# Patient Record
Sex: MALE | Race: WHITE | Employment: FULL TIME | URBAN - METROPOLITAN AREA
[De-identification: names, ages, dates, MRNs, and addresses within clinical notes are randomized per-mention and may not be internally consistent; named-entity substitution may affect disease eponyms.]

---

## 2017-05-01 DIAGNOSIS — L30.9 DERMATITIS: ICD-10-CM

## 2017-06-22 ENCOUNTER — OFFICE VISIT (OUTPATIENT)
Dept: FAMILY MEDICINE CLINIC | Age: 52
End: 2017-06-22

## 2017-06-22 VITALS
DIASTOLIC BLOOD PRESSURE: 82 MMHG | TEMPERATURE: 98.7 F | HEART RATE: 75 BPM | SYSTOLIC BLOOD PRESSURE: 150 MMHG | WEIGHT: 265 LBS | OXYGEN SATURATION: 97 % | BODY MASS INDEX: 36.96 KG/M2

## 2017-06-22 DIAGNOSIS — J20.9 BRONCHITIS WITH BRONCHOSPASM: Primary | ICD-10-CM

## 2017-06-22 PROCEDURE — 99214 OFFICE O/P EST MOD 30 MIN: CPT | Performed by: FAMILY MEDICINE

## 2017-06-22 RX ORDER — GUAIFENESIN AND CODEINE PHOSPHATE 100; 10 MG/5ML; MG/5ML
5 SOLUTION ORAL EVERY 4 HOURS PRN
Qty: 118 ML | Refills: 0 | Status: SHIPPED | OUTPATIENT
Start: 2017-06-22 | End: 2017-06-29

## 2017-06-22 RX ORDER — AZITHROMYCIN 250 MG/1
TABLET, FILM COATED ORAL
Qty: 1 PACKET | Refills: 0 | Status: SHIPPED | OUTPATIENT
Start: 2017-06-22 | End: 2017-07-02

## 2017-06-22 RX ORDER — FLUCONAZOLE 150 MG/1
150 TABLET ORAL ONCE
Qty: 2 TABLET | Refills: 0 | Status: SHIPPED | OUTPATIENT
Start: 2017-06-22 | End: 2017-06-22

## 2017-06-27 ENCOUNTER — HOSPITAL ENCOUNTER (OUTPATIENT)
Dept: OTHER | Age: 52
Discharge: HOME | End: 2017-06-27
Attending: FAMILY MEDICINE | Admitting: FAMILY MEDICINE

## 2017-06-27 DIAGNOSIS — J20.9 BRONCHITIS WITH BRONCHOSPASM: ICD-10-CM

## 2017-06-29 DIAGNOSIS — R93.89 ABNORMAL CHEST X-RAY: Primary | ICD-10-CM

## 2017-06-29 DIAGNOSIS — R05.9 COUGH: ICD-10-CM

## 2017-06-30 LAB
ALLERGEN CANDIDA ALBICANS: 0.13 KU/L
ALLERGEN SEE NOTE: NORMAL

## 2017-09-14 ENCOUNTER — OFFICE VISIT (OUTPATIENT)
Dept: FAMILY MEDICINE CLINIC | Age: 52
End: 2017-09-14

## 2017-09-14 ENCOUNTER — TELEPHONE (OUTPATIENT)
Dept: FAMILY MEDICINE CLINIC | Age: 52
End: 2017-09-14

## 2017-09-14 VITALS
SYSTOLIC BLOOD PRESSURE: 141 MMHG | OXYGEN SATURATION: 96 % | BODY MASS INDEX: 37.31 KG/M2 | HEART RATE: 84 BPM | WEIGHT: 267.5 LBS | RESPIRATION RATE: 20 BRPM | DIASTOLIC BLOOD PRESSURE: 87 MMHG | TEMPERATURE: 97.6 F

## 2017-09-14 DIAGNOSIS — K21.9 GASTROESOPHAGEAL REFLUX DISEASE, ESOPHAGITIS PRESENCE NOT SPECIFIED: ICD-10-CM

## 2017-09-14 DIAGNOSIS — R10.13 EPIGASTRIC PAIN: Primary | ICD-10-CM

## 2017-09-14 DIAGNOSIS — J01.00 ACUTE MAXILLARY SINUSITIS, RECURRENCE NOT SPECIFIED: ICD-10-CM

## 2017-09-14 DIAGNOSIS — F10.90 HEAVY ALCOHOL CONSUMPTION: ICD-10-CM

## 2017-09-14 PROCEDURE — 99214 OFFICE O/P EST MOD 30 MIN: CPT | Performed by: FAMILY MEDICINE

## 2017-09-14 RX ORDER — BENZONATATE 200 MG/1
200 CAPSULE ORAL 3 TIMES DAILY PRN
Qty: 30 CAPSULE | Refills: 0 | Status: ON HOLD | OUTPATIENT
Start: 2017-09-14 | End: 2017-09-22 | Stop reason: HOSPADM

## 2017-09-14 RX ORDER — AMOXICILLIN 875 MG/1
875 TABLET, COATED ORAL 2 TIMES DAILY
Qty: 20 TABLET | Refills: 0 | Status: SHIPPED | OUTPATIENT
Start: 2017-09-14 | End: 2017-09-24

## 2017-09-19 ENCOUNTER — OFFICE VISIT (OUTPATIENT)
Dept: FAMILY MEDICINE CLINIC | Age: 52
End: 2017-09-19

## 2017-09-19 VITALS
WEIGHT: 270 LBS | BODY MASS INDEX: 37.66 KG/M2 | DIASTOLIC BLOOD PRESSURE: 113 MMHG | OXYGEN SATURATION: 96 % | SYSTOLIC BLOOD PRESSURE: 156 MMHG | TEMPERATURE: 98.3 F | HEART RATE: 94 BPM

## 2017-09-19 DIAGNOSIS — R06.02 SHORTNESS OF BREATH: ICD-10-CM

## 2017-09-19 DIAGNOSIS — R07.9 CHEST PAIN, UNSPECIFIED TYPE: Primary | ICD-10-CM

## 2017-09-19 DIAGNOSIS — R94.31 ABNORMAL EKG: ICD-10-CM

## 2017-09-19 PROCEDURE — 99213 OFFICE O/P EST LOW 20 MIN: CPT | Performed by: FAMILY MEDICINE

## 2017-09-19 PROCEDURE — 93000 ELECTROCARDIOGRAM COMPLETE: CPT | Performed by: FAMILY MEDICINE

## 2017-09-20 PROBLEM — I50.21 ACUTE SYSTOLIC HEART FAILURE (HCC): Status: ACTIVE | Noted: 2017-09-20

## 2017-09-21 ENCOUNTER — TELEPHONE (OUTPATIENT)
Dept: FAMILY MEDICINE CLINIC | Age: 52
End: 2017-09-21

## 2017-09-25 ENCOUNTER — TELEPHONE (OUTPATIENT)
Dept: CARDIOLOGY CLINIC | Age: 52
End: 2017-09-25

## 2017-09-27 ENCOUNTER — OFFICE VISIT (OUTPATIENT)
Dept: CARDIOLOGY CLINIC | Age: 52
End: 2017-09-27

## 2017-09-27 VITALS
WEIGHT: 254.8 LBS | DIASTOLIC BLOOD PRESSURE: 72 MMHG | BODY MASS INDEX: 33.62 KG/M2 | SYSTOLIC BLOOD PRESSURE: 126 MMHG | HEART RATE: 78 BPM

## 2017-09-27 DIAGNOSIS — I50.22 CHRONIC SYSTOLIC CONGESTIVE HEART FAILURE (HCC): ICD-10-CM

## 2017-09-27 DIAGNOSIS — I50.21 ACUTE SYSTOLIC CONGESTIVE HEART FAILURE (HCC): Primary | ICD-10-CM

## 2017-09-27 DIAGNOSIS — I42.0 CARDIOMYOPATHY, DILATED, NONISCHEMIC (HCC): ICD-10-CM

## 2017-09-27 PROCEDURE — 99214 OFFICE O/P EST MOD 30 MIN: CPT | Performed by: NURSE PRACTITIONER

## 2017-09-27 ASSESSMENT — ENCOUNTER SYMPTOMS
GASTROINTESTINAL NEGATIVE: 1
RESPIRATORY NEGATIVE: 1

## 2017-09-29 ENCOUNTER — OFFICE VISIT (OUTPATIENT)
Dept: FAMILY MEDICINE CLINIC | Age: 52
End: 2017-09-29

## 2017-09-29 ENCOUNTER — TELEPHONE (OUTPATIENT)
Dept: FAMILY MEDICINE CLINIC | Age: 52
End: 2017-09-29

## 2017-09-29 VITALS
WEIGHT: 252.8 LBS | HEART RATE: 78 BPM | TEMPERATURE: 98.6 F | SYSTOLIC BLOOD PRESSURE: 120 MMHG | RESPIRATION RATE: 16 BRPM | BODY MASS INDEX: 33.35 KG/M2 | DIASTOLIC BLOOD PRESSURE: 80 MMHG

## 2017-09-29 DIAGNOSIS — I50.21 ACUTE SYSTOLIC CONGESTIVE HEART FAILURE (HCC): Primary | ICD-10-CM

## 2017-09-29 DIAGNOSIS — I50.21 ACUTE SYSTOLIC CONGESTIVE HEART FAILURE (HCC): ICD-10-CM

## 2017-09-29 LAB
ANION GAP SERPL CALCULATED.3IONS-SCNC: 18 MMOL/L (ref 3–16)
BUN BLDV-MCNC: 17 MG/DL (ref 7–20)
CALCIUM SERPL-MCNC: 9.9 MG/DL (ref 8.3–10.6)
CHLORIDE BLD-SCNC: 101 MMOL/L (ref 99–110)
CO2: 28 MMOL/L (ref 21–32)
CREAT SERPL-MCNC: 0.9 MG/DL (ref 0.9–1.3)
GFR AFRICAN AMERICAN: >60
GFR NON-AFRICAN AMERICAN: >60
GLUCOSE BLD-MCNC: 97 MG/DL (ref 70–99)
POTASSIUM SERPL-SCNC: 4.5 MMOL/L (ref 3.5–5.1)
SODIUM BLD-SCNC: 147 MMOL/L (ref 136–145)

## 2017-09-29 PROCEDURE — 99213 OFFICE O/P EST LOW 20 MIN: CPT | Performed by: FAMILY MEDICINE

## 2017-10-03 ENCOUNTER — OFFICE VISIT (OUTPATIENT)
Dept: CARDIOLOGY CLINIC | Age: 52
End: 2017-10-03

## 2017-10-03 VITALS
HEART RATE: 60 BPM | WEIGHT: 250.8 LBS | BODY MASS INDEX: 33.09 KG/M2 | SYSTOLIC BLOOD PRESSURE: 120 MMHG | DIASTOLIC BLOOD PRESSURE: 80 MMHG

## 2017-10-03 DIAGNOSIS — I50.21 ACUTE SYSTOLIC CONGESTIVE HEART FAILURE (HCC): ICD-10-CM

## 2017-10-03 DIAGNOSIS — I50.22 CHRONIC SYSTOLIC CONGESTIVE HEART FAILURE (HCC): Primary | ICD-10-CM

## 2017-10-03 DIAGNOSIS — I42.0 CARDIOMYOPATHY, DILATED, NONISCHEMIC (HCC): ICD-10-CM

## 2017-10-03 PROCEDURE — 99212 OFFICE O/P EST SF 10 MIN: CPT | Performed by: INTERNAL MEDICINE

## 2017-10-03 RX ORDER — CARVEDILOL 6.25 MG/1
6.25 TABLET ORAL 2 TIMES DAILY WITH MEALS
Qty: 60 TABLET | Refills: 3 | Status: SHIPPED | OUTPATIENT
Start: 2017-10-03 | End: 2017-12-04 | Stop reason: SDUPTHER

## 2017-10-03 NOTE — MR AVS SNAPSHOT
After Visit Summary             Iman Nitin   10/3/2017 9:15 AM   Office Visit    Description:  Male : 1965   Provider:  Vickie Fitch MD   Department:  [address] and Future Appointments         Below is a list of your follow-up and future appointments. This may not be a complete list as you may have made appointments directly with providers that we are not aware of or your providers may have made some for you. Please call your providers to confirm appointments. It is important to keep your appointments. Please bring your current insurance card, photo ID, co-pay, and all medication bottles to your appointment. If self-pay, payment is expected at the time of service. Your To-Do List     Future Appointments Provider Department Dept Phone    10/20/2017 9:00 AM Carina Nguyen DO [facility] [phone]    Please arrive 15 minutes prior to appointment, bring photo ID and insurance card. 2017 9:45 AM Vickie Fitch MD [address] [place] [address] [phone]    Please arrive 15 minutes prior to appointment, bring photo ID and insurance card. Information from Your Visit        Department     Name Address Phone Fax    [facility]  [place] [address] [phone]      Vital Signs     Blood Pressure Pulse Weight Body Mass Index Smoking Status       120/80 60 250 lb 12.8 oz (113.8 kg) 33.09 kg/m2 Never Smoker       Additional Information about your Body Mass Index (BMI)           Your BMI as listed above is considered obese (30 or more). BMI is an estimate of body fat, calculated from your height and weight. The higher your BMI, the greater your risk of heart disease, high blood pressure, type 2 diabetes, stroke, gallstones, arthritis, sleep apnea, and certain cancers. BMI is not perfect.   It may overestimate body fat in athletes and have a clinical question, please call your doctor's office.

## 2017-10-03 NOTE — PROGRESS NOTES
[facility]     Outpatient Follow Up Note    Subjective:   CHIEF COMPLAINT / HPI:  Dyspnea     Iman Taveras  presents today  for follow up for recent hospitalization for congestive heart failure. He feels much better. His breathing has improved a lot and weight is fine. He lost around 30 lbs of water. Angiogram without CAD. LVEF 20% on echo. He is tolerating the meds. Past Medical History:    Past Medical History:   Diagnosis Date    Esophagitis     In [address], EGD    Hypertension        Social History:    History   Smoking Status    Never Smoker   Smokeless Tobacco    Current User    Types: Snuff         Family History   Problem Relation Age of Onset    Diabetes Maternal Grandmother     Alcohol Abuse Maternal Aunt     Alcohol Abuse Maternal Uncle     Alcohol Abuse Maternal Grandfather     Hypertension Father     Heart Disease Father 48    Bipolar Disorder Maternal Aunt     Obesity Brother        Current Medications:  Current Outpatient Prescriptions on File Prior to Visit   Medication Sig Dispense Refill    aspirin 81 MG chewable tablet Take 1 tablet by mouth daily 30 tablet 3    carvedilol (COREG) 3.125 MG tablet Take 1 tablet by mouth 2 times daily (with meals) 60 tablet 3    lisinopril (PRINIVIL;ZESTRIL) 10 MG tablet Take 1 tablet by mouth daily 30 tablet 3    spironolactone (ALDACTONE) 25 MG tablet Take 1 tablet by mouth daily 30 tablet 3    furosemide (LASIX) 40 MG tablet Take 1 tablet by mouth 2 times daily 60 tablet 0    fluocinonide (LIDEX) 0.05 % cream APPLY TO AFFECTED AREA(S) TWO TIMES A DAY 30 g 0    terbinafine (LAMISIL) 1 % cream Apply to affected areas 2x/day for 2-4 weeks, until rash resolved. 24 g 1    triamcinolone (KENALOG) 0.1 % cream Apply topically 2 times daily. 45 g 1     No current facility-administered medications on file prior to visit.         REVIEW OF SYSTEMS:      CONSTITUTIONAL: No major weight gain or loss, fatigue, weakness, night sweats or fever.  HEENT: No new vision difficulties or ringing in the ears. RESPIRATORY: No new SOB, PND, orthopnea or cough. CARDIOVASCULAR: See HPI  GI: No nausea, vomiting, diarrhea, constipation, abdominal pain or changes in bowel habits. : No urinary frequency, urgency, incontinence hematuria or dysuria. SKIN: No cyanosis or skin lesions. MUSCULOSKELETAL: No new muscle or joint pain. NEUROLOGICAL: No syncope or TIA-like symptoms. No change in sensation or strength. PSYCHIATRIC: No anxiety, pain, insomnia or depression    Objective:   PHYSICAL EXAM:        VITALS:  /80  Pulse 60  Wt 250 lb 12.8 oz (113.8 kg)  BMI 33.09 kg/m2  CONSTITUTIONAL: Cooperative, no apparent distress, and appears well nourished / developed  NEUROLOGIC:  Awake and orientated to person, place and time. Sensation normal Motor normal.  PSYCH: Calm affect. SKIN: Warm and dry. HEENT: Sclera non-icteric, normocephalic. EOMI  Neck: Supple, no elevation of JVP, normal carotid pulses with no bruits and thyroid normal size. LUNGS:  No increased work of breathing and clear to auscultation, no crackles or wheezing  CARDIOVASCULAR:  Regular rate and rhythm with no murmurs, gallops, rubs, or abnormal heart sounds. The apical impulse not displaced  The carotid upstroke is normal in amplitude and contour without delay or bruit  JVP is not elevated  ABDOMEN:  Normal bowel sounds, non-distended and non-tender to palpation. No liver or spleen enlargement. EXT: No edema, no calf tenderness. Pulses are present bilaterally.     DATA:    No results found for: ALT, AST, GGT, ALKPHOS, BILITOT  Lab Results   Component Value Date    CREATININE 0.9 09/29/2017    BUN 17 09/29/2017     (H) 09/29/2017    K 4.5 09/29/2017     09/29/2017    CO2 28 09/29/2017     No results found for: TSH, P3JHDJL, A5VCONL, THYROIDAB  Lab Results   Component Value Date    WBC 6.5 09/22/2017    HGB 14.7 09/22/2017    HCT 44.3 09/22/2017    MCV 90.6 09/22/2017    PLT 275 09/22/2017     No components found for: CHLPL  Lab Results   Component Value Date    TRIG 97 09/21/2017    TRIG 150 09/20/2017     Lab Results   Component Value Date    HDL 51 09/21/2017    HDL 55 09/20/2017     Lab Results   Component Value Date    LDLCALC 110 (H) 09/21/2017    LDLCALC 108 (H) 09/20/2017     Lab Results   Component Value Date    LABVLDL 19 09/21/2017    LABVLDL 30 09/20/2017     :   Assessment /  Plan   Nonischemic cardiomyopathy  Improved with meds  Plan to uptitrate meds  Increase Coreg to 6.25 mg po bid               Domenic CHAVIRA  [facility]  I attest that this note was completed entirely by me

## 2017-10-20 ENCOUNTER — OFFICE VISIT (OUTPATIENT)
Dept: FAMILY MEDICINE CLINIC | Age: 52
End: 2017-10-20

## 2017-10-20 VITALS
HEART RATE: 69 BPM | OXYGEN SATURATION: 97 % | SYSTOLIC BLOOD PRESSURE: 133 MMHG | RESPIRATION RATE: 16 BRPM | DIASTOLIC BLOOD PRESSURE: 78 MMHG | HEIGHT: 73 IN | TEMPERATURE: 98.1 F

## 2017-10-20 DIAGNOSIS — Z23 NEED FOR INFLUENZA VACCINATION: ICD-10-CM

## 2017-10-20 DIAGNOSIS — I42.0 CARDIOMYOPATHY, DILATED, NONISCHEMIC (HCC): Primary | ICD-10-CM

## 2017-10-20 PROCEDURE — 90686 IIV4 VACC NO PRSV 0.5 ML IM: CPT | Performed by: FAMILY MEDICINE

## 2017-10-20 PROCEDURE — 90471 IMMUNIZATION ADMIN: CPT | Performed by: FAMILY MEDICINE

## 2017-10-20 PROCEDURE — 99213 OFFICE O/P EST LOW 20 MIN: CPT | Performed by: FAMILY MEDICINE

## 2017-10-20 NOTE — PROGRESS NOTES
[facility]  Progress Note  Marylene Rosa  1965    10/23/17    Chief Complaint:   Paige Biswas is a 50 y.o. male who is here for f/iu for CHF. HPI:   Back to increased work hours. Some afternoon fatigue. Increased stress taking care of family. Fatigue has improved. Has been able to return to work full time. At times will need to leave work early. Work is honoring [place] form I completed. ROS negative for headache, vision changes, chest pain, shortness of breath, abdominal pain, urinary sx, bowel changes. Past medical, surgical, and social history reviewed. Medications and allergies reviewed. No Known Allergies  Prior to Visit Medications    Medication Sig Taking? Authorizing Provider   Blood Pressure Monitoring (SPHYGMOMANOMETER) MISC Check blood pressure once weekly. Yes Angelo Palacios DO   carvedilol (COREG) 6.25 MG tablet Take 1 tablet by mouth 2 times daily (with meals) Yes Daxa Prajapati MD   aspirin 81 MG chewable tablet Take 1 tablet by mouth daily Yes Sheryle Linea, MD   carvedilol (COREG) 3.125 MG tablet Take 1 tablet by mouth 2 times daily (with meals) Yes Sheryle Linea, MD   lisinopril (PRINIVIL;ZESTRIL) 10 MG tablet Take 1 tablet by mouth daily Yes Sheryle Linea, MD   spironolactone (ALDACTONE) 25 MG tablet Take 1 tablet by mouth daily Yes Sheryle Linea, MD   furosemide (LASIX) 40 MG tablet Take 1 tablet by mouth 2 times daily Yes Sheryle Linea, MD   fluocinonide (LIDEX) 0.05 % cream APPLY TO AFFECTED AREA(S) TWO TIMES A DAY  Tiago Palacios, DO   terbinafine (LAMISIL) 1 % cream Apply to affected areas 2x/day for 2-4 weeks, until rash resolved. Tiago Palacios, DO   triamcinolone (KENALOG) 0.1 % cream Apply topically 2 times daily.   Raquel Garcia MD          Vitals:    10/20/17 0922 10/20/17 1019   BP: (!) 145/88 133/78   Pulse: 69    Resp: 16    Temp: 98.4140952981801 °F (36.7 °C)    TempSrc: Oral SpO2: 97%    Height: 6' 0.96\" (1.853 m)       Wt Readings from Last 3 Encounters:   10/03/17 250 lb 12.8 oz (113.8 kg)   09/29/17 252 lb 12.8 oz (114.7 kg)   09/27/17 254 lb 12.8 oz (115.6 kg)     BP Readings from Last 3 Encounters:   10/20/17 133/78   10/03/17 120/80   09/29/17 120/80       Patient Active Problem List   Diagnosis    Contact dermatitis    Adult BMI 30+    Gastroesophageal reflux disease    Heavy alcohol consumption    Acute systolic congestive heart failure (HCC)    Cardiomyopathy, dilated, nonischemic (HCC)    Chronic systolic congestive heart failure ([facility] [address]           Immunization History   Administered Date(s) Administered    Influenza, Quadv, 3 yrs and older, IM, Preservative Free 10/20/2017    Tdap (Boostrix, Adacel) 07/19/2007       Past Medical History:   Diagnosis Date    Esophagitis     In [place], EGD    Hypertension      History reviewed. No pertinent surgical history. Family History   Problem Relation Age of Onset    Diabetes Maternal Grandmother     Alcohol Abuse Maternal Aunt     Alcohol Abuse Maternal Uncle     Alcohol Abuse Maternal Grandfather     Hypertension Father     Heart Disease Father 48    Bipolar Disorder Maternal Aunt     Obesity Brother      Social History     Social History    Marital status:      Spouse name: N/A    Number of children: N/A    Years of education: N/A     Occupational History    Not on file. Social History Main Topics    Smoking status: Never Smoker    Smokeless tobacco: Current User     Types: Snuff    Alcohol use Yes      Comment: occasional alcohol use; 7 to 14    Drug use: No    Sexual activity: Not on file     Other Topics Concern    Not on file     Social History Narrative    No narrative on file       O: /78   Pulse 69   Temp 98.4757369386895 °F (36.7 °C) (Oral)   Resp 16   Ht 6' 0.96\" (1.853 m)   SpO2 97%   Physical Exam  GEN: No acute distress, cooperative, well nourished, alert.    HEENT: PEERLA, EOMI , normocephalic/atraumatic, nares and oropharynx clear. Mucus membranes normal, Tympanic membranes clear bilaterally. Neck: soft, supple, no thyromegaly, mass, no Lymphadenopathy  CV: Regular rate and rhythm, no murmur, rubs, gallops. No edema. Resp: Clear to auscultation bilaterally good air entry bilaterally  No crackles, wheeze. Breathing comfortably. Psych: normal affect. Neuro: AOx3      ASSESSMENT  1. Cardiomyopathy, dilated, nonischemic (HCC)  Blood Pressure Monitoring (SPHYGMOMANOMETER) MISC   2. Need for influenza vaccination  INFLUENZA, QUADV, 3 YRS AND OLDER, IM, PF, PREFILL SYR OR SDV, 0.5ML (FLUZONE QUADV, PF)           PLAN          See rest of plan under patient instructions. Return in about 6 months (around 4/20/2018) for Wellness/Health Maintenance. Patient Instructions   1) Track your blood pressure and heart rate, especially when feeling more fatigued. If BP averaging > 140/90, please call Dr. Angy Joseph or Dr. Hari Carias. 2) You received your flu shot today. 3) f/u in about 6 to 9 months for wellness visit. Please note a portion of this chart was generated using dragon dictation software. Although every effort was made to ensure the accuracy of this automated transcription, some errors in transcription may have occurred.

## 2017-10-20 NOTE — PATIENT INSTRUCTIONS
1) Track your blood pressure and heart rate, especially when feeling more fatigued. If BP averaging > 140/90, please call Dr. Christal Butler or Dr. Calista Weinberg. 2) You received your flu shot today. 3) f/u in about 6 to 9 months for wellness visit.

## 2017-10-31 DIAGNOSIS — I50.22 CHRONIC SYSTOLIC CONGESTIVE HEART FAILURE (HCC): Primary | ICD-10-CM

## 2017-11-02 RX ORDER — FUROSEMIDE 40 MG/1
TABLET ORAL
Qty: 45 TABLET | Refills: 5 | Status: SHIPPED | OUTPATIENT
Start: 2017-11-02 | End: 2018-03-01 | Stop reason: SDUPTHER

## 2017-12-04 ENCOUNTER — OFFICE VISIT (OUTPATIENT)
Dept: CARDIOLOGY CLINIC | Age: 52
End: 2017-12-04

## 2017-12-04 VITALS
BODY MASS INDEX: 33.76 KG/M2 | WEIGHT: 255.6 LBS | HEART RATE: 60 BPM | SYSTOLIC BLOOD PRESSURE: 138 MMHG | DIASTOLIC BLOOD PRESSURE: 80 MMHG

## 2017-12-04 DIAGNOSIS — I42.0 CARDIOMYOPATHY, DILATED, NONISCHEMIC (HCC): ICD-10-CM

## 2017-12-04 DIAGNOSIS — I50.21 ACUTE SYSTOLIC CONGESTIVE HEART FAILURE (HCC): ICD-10-CM

## 2017-12-04 DIAGNOSIS — I50.22 CHRONIC SYSTOLIC CONGESTIVE HEART FAILURE (HCC): ICD-10-CM

## 2017-12-04 PROCEDURE — 99213 OFFICE O/P EST LOW 20 MIN: CPT | Performed by: INTERNAL MEDICINE

## 2017-12-04 RX ORDER — CARVEDILOL 6.25 MG/1
12.5 TABLET ORAL 2 TIMES DAILY WITH MEALS
Qty: 120 TABLET | Refills: 5 | Status: SHIPPED | OUTPATIENT
Start: 2017-12-04 | End: 2018-01-04 | Stop reason: CLARIF

## 2017-12-04 NOTE — PROGRESS NOTES
[address]     Outpatient Follow Up Note    Subjective:   CHIEF COMPLAINT / HPI:  Dyspnea     Ermias Sin  presents today  for follow up for  congestive heart failure. He feels much better. His breathing remains stable. No edema and minimal dyspnea  He is tolerating the meds. Past Medical History:    Past Medical History:   Diagnosis Date    Esophagitis     In [place], EGD    Hypertension        Social History:    History   Smoking Status    Never Smoker   Smokeless Tobacco    Current User    Types: Snuff         Family History   Problem Relation Age of Onset    Diabetes Maternal Grandmother     Alcohol Abuse Maternal Aunt     Alcohol Abuse Maternal Uncle     Alcohol Abuse Maternal Grandfather     Hypertension Father     Heart Disease Father 48    Bipolar Disorder Maternal Aunt     Obesity Brother        Current Medications:  Current Outpatient Prescriptions on File Prior to Visit   Medication Sig Dispense Refill    furosemide (LASIX) 40 MG tablet Take 1 tablet by mouth in the morning and 1/2 tablet in the evening 45 tablet 5    Blood Pressure Monitoring (SPHYGMOMANOMETER) MISC Check blood pressure once weekly. 1 each 0    carvedilol (COREG) 6.25 MG tablet Take 1 tablet by mouth 2 times daily (with meals) 60 tablet 3    aspirin 81 MG chewable tablet Take 1 tablet by mouth daily 30 tablet 3    carvedilol (COREG) 3.125 MG tablet Take 1 tablet by mouth 2 times daily (with meals) 60 tablet 3    lisinopril (PRINIVIL;ZESTRIL) 10 MG tablet Take 1 tablet by mouth daily 30 tablet 3    spironolactone (ALDACTONE) 25 MG tablet Take 1 tablet by mouth daily 30 tablet 3    fluocinonide (LIDEX) 0.05 % cream APPLY TO AFFECTED AREA(S) TWO TIMES A DAY 30 g 0    terbinafine (LAMISIL) 1 % cream Apply to affected areas 2x/day for 2-4 weeks, until rash resolved. 24 g 1    triamcinolone (KENALOG) 0.1 % cream Apply topically 2 times daily.  45 g 1     No current facility-administered medications on file prior to visit. REVIEW OF SYSTEMS:      CONSTITUTIONAL: No major weight gain or loss, fatigue, weakness, night sweats or fever. HEENT: No new vision difficulties or ringing in the ears. RESPIRATORY: No new SOB, PND, orthopnea or cough. CARDIOVASCULAR: See HPI  GI: No nausea, vomiting, diarrhea, constipation, abdominal pain or changes in bowel habits. : No urinary frequency, urgency, incontinence hematuria or dysuria. SKIN: No cyanosis or skin lesions. MUSCULOSKELETAL: No new muscle or joint pain. NEUROLOGICAL: No syncope or TIA-like symptoms. No change in sensation or strength. PSYCHIATRIC: No anxiety, pain, insomnia or depression    Objective:   PHYSICAL EXAM:        VITALS:  /80   Pulse 60   Wt 255 lb 9.6 oz (115.9 kg)   BMI 33.76 kg/m²   CONSTITUTIONAL: Cooperative, no apparent distress, and appears well nourished / developed  NEUROLOGIC:  Awake and orientated to person, place and time. Sensation normal Motor normal.  PSYCH: Calm affect. SKIN: Warm and dry. HEENT: Sclera non-icteric, normocephalic. EOMI  Neck: Supple, no elevation of JVP, normal carotid pulses with no bruits and thyroid normal size. LUNGS:  No increased work of breathing and clear to auscultation, no crackles or wheezing  CARDIOVASCULAR:  Regular rate and rhythm with no murmurs, gallops, rubs, or abnormal heart sounds. The apical impulse not displaced  The carotid upstroke is normal in amplitude and contour without delay or bruit  JVP is not elevated  ABDOMEN:  Normal bowel sounds, non-distended and non-tender to palpation. No liver or spleen enlargement. EXT: No edema, no calf tenderness. Pulses are present bilaterally.     DATA:    No results found for: ALT, AST, GGT, ALKPHOS, BILITOT  Lab Results   Component Value Date    CREATININE 0.9 09/29/2017    BUN 17 09/29/2017     (H) 09/29/2017    K 4.5 09/29/2017     09/29/2017    CO2 28 09/29/2017     No results found for: TSH, M0NSSBO, L0KHJFM, [facility]  Lab Results   Component Value Date    WBC 6.5 09/22/2017    HGB 14.7 09/22/2017    HCT 44.3 09/22/2017    MCV 90.6 09/22/2017     09/22/2017     No components found for: CHLPL  Lab Results   Component Value Date    TRIG 97 09/21/2017    TRIG 150 09/20/2017     Lab Results   Component Value Date    HDL 51 09/21/2017    HDL 55 09/20/2017     Lab Results   Component Value Date    LDLCALC 110 (H) 09/21/2017    LDLCALC 108 (H) 09/20/2017     Lab Results   Component Value Date    LABVLDL 19 09/21/2017    LABVLDL 30 09/20/2017     :   Assessment /  Plan   Nonischemic cardiomyopathy  Increase Coreg to 12.5 mg po bid  RTC 4 weeks              Domenic L.  [facility]  I attest that this note was completed entirely by me

## 2018-01-04 ENCOUNTER — OFFICE VISIT (OUTPATIENT)
Dept: CARDIOLOGY CLINIC | Age: 53
End: 2018-01-04

## 2018-01-04 VITALS
DIASTOLIC BLOOD PRESSURE: 84 MMHG | WEIGHT: 260 LBS | HEART RATE: 78 BPM | BODY MASS INDEX: 34.34 KG/M2 | SYSTOLIC BLOOD PRESSURE: 136 MMHG

## 2018-01-04 DIAGNOSIS — I50.22 CHRONIC SYSTOLIC CONGESTIVE HEART FAILURE (HCC): ICD-10-CM

## 2018-01-04 DIAGNOSIS — I42.0 CARDIOMYOPATHY, DILATED, NONISCHEMIC (HCC): Primary | ICD-10-CM

## 2018-01-04 PROCEDURE — 99213 OFFICE O/P EST LOW 20 MIN: CPT | Performed by: INTERNAL MEDICINE

## 2018-01-04 NOTE — PROGRESS NOTES
09/22/2017    HCT 44.3 09/22/2017    MCV 90.6 09/22/2017     09/22/2017     No components found for: CHLPL  Lab Results   Component Value Date    TRIG 97 09/21/2017    TRIG 150 09/20/2017     Lab Results   Component Value Date    HDL 51 09/21/2017    HDL 55 09/20/2017     Lab Results   Component Value Date    LDLCALC 110 (H) 09/21/2017    LDLCALC 108 (H) 09/20/2017     Lab Results   Component Value Date    LABVLDL 19 09/21/2017    LABVLDL 30 09/20/2017     :   Assessment /  Plan   Nonischemic cardiomyopathy  Improved symptoms  Increase Coreg to 18.75 mg po bid  RTC 3 weeks

## 2018-01-05 RX ORDER — CARVEDILOL 6.25 MG/1
6.25 TABLET ORAL
Qty: 90 TABLET | Refills: 5 | Status: SHIPPED | OUTPATIENT
Start: 2018-01-05 | End: 2018-01-12 | Stop reason: SDUPTHER

## 2018-01-12 ENCOUNTER — TELEPHONE (OUTPATIENT)
Dept: CARDIOLOGY CLINIC | Age: 53
End: 2018-01-12

## 2018-01-12 DIAGNOSIS — I50.22 CHRONIC SYSTOLIC CONGESTIVE HEART FAILURE (HCC): Primary | ICD-10-CM

## 2018-01-12 RX ORDER — SPIRONOLACTONE 25 MG/1
25 TABLET ORAL DAILY
Qty: 30 TABLET | Refills: 3 | Status: SHIPPED | OUTPATIENT
Start: 2018-01-12 | End: 2018-05-18 | Stop reason: SINTOL

## 2018-01-12 RX ORDER — LISINOPRIL 10 MG/1
10 TABLET ORAL DAILY
Qty: 30 TABLET | Refills: 3 | Status: SHIPPED | OUTPATIENT
Start: 2018-01-12 | End: 2018-01-31 | Stop reason: SDUPTHER

## 2018-01-12 RX ORDER — CARVEDILOL 12.5 MG/1
18.75 TABLET ORAL 2 TIMES DAILY
Qty: 75 TABLET | Refills: 3 | Status: SHIPPED | OUTPATIENT
Start: 2018-01-12 | End: 2018-02-16 | Stop reason: SDUPTHER

## 2018-01-12 NOTE — TELEPHONE ENCOUNTER
Patient has some confusion with medications after picking up his medication from pharmacy. He would like to speak with barbara [phone].

## 2018-01-31 ENCOUNTER — OFFICE VISIT (OUTPATIENT)
Dept: CARDIOLOGY CLINIC | Age: 53
End: 2018-01-31

## 2018-01-31 VITALS
BODY MASS INDEX: 35.19 KG/M2 | WEIGHT: 266.4 LBS | DIASTOLIC BLOOD PRESSURE: 72 MMHG | SYSTOLIC BLOOD PRESSURE: 146 MMHG | HEART RATE: 86 BPM

## 2018-01-31 DIAGNOSIS — I42.0 CARDIOMYOPATHY, DILATED, NONISCHEMIC (HCC): ICD-10-CM

## 2018-01-31 DIAGNOSIS — I10 ESSENTIAL HYPERTENSION: ICD-10-CM

## 2018-01-31 DIAGNOSIS — I50.22 CHRONIC SYSTOLIC CONGESTIVE HEART FAILURE (HCC): Primary | ICD-10-CM

## 2018-01-31 PROCEDURE — 99214 OFFICE O/P EST MOD 30 MIN: CPT | Performed by: NURSE PRACTITIONER

## 2018-01-31 RX ORDER — LISINOPRIL 10 MG/1
10 TABLET ORAL 2 TIMES DAILY
Qty: 60 TABLET | Refills: 3 | Status: SHIPPED | OUTPATIENT
Start: 2018-01-31 | End: 2018-02-16 | Stop reason: ALTCHOICE

## 2018-01-31 ASSESSMENT — ENCOUNTER SYMPTOMS
SHORTNESS OF BREATH: 1
GASTROINTESTINAL NEGATIVE: 1

## 2018-01-31 NOTE — PROGRESS NOTES
range of motion. Neck supple. Cardiovascular: Normal rate, regular rhythm, normal heart sounds and intact distal pulses. Pulmonary/Chest: Effort normal and breath sounds normal.   Abdominal: Soft. Musculoskeletal: Normal range of motion. He exhibits edema. Trace edema bilaterally   Neurological: He is alert and oriented to person, place, and time. Skin: Skin is warm and dry. Psychiatric: He has a normal mood and affect. Lab Data:    CBC:   Lab Results   Component Value Date    WBC 6.5 09/22/2017    WBC 6.1 09/21/2017    WBC 7.7 09/20/2017    RBC 4.89 09/22/2017    RBC 4.79 09/21/2017    RBC 4.82 09/20/2017    HGB 14.7 09/22/2017    HGB 14.6 09/21/2017    HGB 14.5 09/20/2017    HCT 44.3 09/22/2017    HCT 43.3 09/21/2017    HCT 43.4 09/20/2017    MCV 90.6 09/22/2017    MCV 90.4 09/21/2017    MCV 90.1 09/20/2017    RDW 14.1 09/22/2017    RDW 13.8 09/21/2017    RDW 13.7 09/20/2017     09/22/2017     09/21/2017     09/20/2017     BMP:  Lab Results   Component Value Date     09/29/2017     09/22/2017     09/21/2017    K 4.5 09/29/2017    K 3.8 09/22/2017    K 3.4 09/21/2017     09/29/2017    CL 98 09/22/2017    CL 99 09/21/2017    CO2 28 09/29/2017    CO2 28 09/22/2017    CO2 29 09/21/2017    PHOS 3.3 09/22/2017    PHOS 3.6 09/21/2017    PHOS 4.4 09/20/2017    BUN 17 09/29/2017    BUN 14 09/22/2017    BUN 16 09/21/2017    CREATININE 0.9 09/29/2017    CREATININE 0.9 09/22/2017    CREATININE 0.9 09/21/2017     BNP: No results found for: PROBNP     Cardiac Imaging: Echo 9/19/17  Summary   Left ventricular size is moderately dilated with mild concentric left   ventricular hypertrophy. The left ventricular systolic function is severely reduced with an ejection   fraction of 20 %. Global hypokinesis of the left ventricle. Moderate mitral regurgitation   Trivial aortic regurgitation   Moderate tricuspid regurgitation with RVSP estimated at 67 mmHg.    Biatrial

## 2018-02-05 DIAGNOSIS — I50.22 CHRONIC SYSTOLIC CONGESTIVE HEART FAILURE (HCC): ICD-10-CM

## 2018-02-05 LAB
ANION GAP SERPL CALCULATED.3IONS-SCNC: 17 MMOL/L (ref 3–16)
BUN BLDV-MCNC: 22 MG/DL (ref 7–20)
CALCIUM SERPL-MCNC: 9.6 MG/DL (ref 8.3–10.6)
CHLORIDE BLD-SCNC: 99 MMOL/L (ref 99–110)
CO2: 29 MMOL/L (ref 21–32)
CREAT SERPL-MCNC: 0.9 MG/DL (ref 0.9–1.3)
GFR AFRICAN AMERICAN: >60
GFR NON-AFRICAN AMERICAN: >60
GLUCOSE BLD-MCNC: 93 MG/DL (ref 70–99)
POTASSIUM SERPL-SCNC: 4.1 MMOL/L (ref 3.5–5.1)
PRO-BNP: 652 PG/ML (ref 0–124)
SODIUM BLD-SCNC: 145 MMOL/L (ref 136–145)

## 2018-02-12 ENCOUNTER — TELEPHONE (OUTPATIENT)
Dept: CARDIOLOGY CLINIC | Age: 53
End: 2018-02-12

## 2018-02-16 ENCOUNTER — OFFICE VISIT (OUTPATIENT)
Dept: CARDIOLOGY CLINIC | Age: 53
End: 2018-02-16

## 2018-02-16 VITALS — BODY MASS INDEX: 34.66 KG/M2 | SYSTOLIC BLOOD PRESSURE: 128 MMHG | DIASTOLIC BLOOD PRESSURE: 72 MMHG | WEIGHT: 262.4 LBS

## 2018-02-16 DIAGNOSIS — I50.22 CHRONIC SYSTOLIC CONGESTIVE HEART FAILURE (HCC): Primary | ICD-10-CM

## 2018-02-16 DIAGNOSIS — I42.0 CARDIOMYOPATHY, DILATED, NONISCHEMIC (HCC): ICD-10-CM

## 2018-02-16 DIAGNOSIS — R06.09 DYSPNEA ON EXERTION: ICD-10-CM

## 2018-02-16 DIAGNOSIS — I50.22 CHRONIC SYSTOLIC CONGESTIVE HEART FAILURE (HCC): ICD-10-CM

## 2018-02-16 DIAGNOSIS — I10 ESSENTIAL HYPERTENSION: ICD-10-CM

## 2018-02-16 PROCEDURE — 3017F COLORECTAL CA SCREEN DOC REV: CPT | Performed by: NURSE PRACTITIONER

## 2018-02-16 PROCEDURE — G8484 FLU IMMUNIZE NO ADMIN: HCPCS | Performed by: NURSE PRACTITIONER

## 2018-02-16 PROCEDURE — 99213 OFFICE O/P EST LOW 20 MIN: CPT | Performed by: NURSE PRACTITIONER

## 2018-02-16 PROCEDURE — G8427 DOCREV CUR MEDS BY ELIG CLIN: HCPCS | Performed by: NURSE PRACTITIONER

## 2018-02-16 PROCEDURE — 4004F PT TOBACCO SCREEN RCVD TLK: CPT | Performed by: NURSE PRACTITIONER

## 2018-02-16 PROCEDURE — G8417 CALC BMI ABV UP PARAM F/U: HCPCS | Performed by: NURSE PRACTITIONER

## 2018-02-16 RX ORDER — CARVEDILOL 12.5 MG/1
25 TABLET ORAL 2 TIMES DAILY
Qty: 60 TABLET | Refills: 3 | Status: SHIPPED | OUTPATIENT
Start: 2018-02-16 | End: 2018-07-12 | Stop reason: SDUPTHER

## 2018-02-16 RX ORDER — OSELTAMIVIR PHOSPHATE 75 MG/1
75 CAPSULE ORAL 2 TIMES DAILY
Qty: 20 CAPSULE | Refills: 0 | Status: SHIPPED | OUTPATIENT
Start: 2018-02-16 | End: 2018-02-26

## 2018-02-16 ASSESSMENT — ENCOUNTER SYMPTOMS
RHINORRHEA: 1
SHORTNESS OF BREATH: 1
GASTROINTESTINAL NEGATIVE: 1

## 2018-02-19 ENCOUNTER — TELEPHONE (OUTPATIENT)
Dept: CARDIOLOGY CLINIC | Age: 53
End: 2018-02-19

## 2018-02-19 NOTE — TELEPHONE ENCOUNTER
[employer] requesting a quantity change on the Coreg that was sent by LIDIA Lucas last week. Please advise.

## 2018-02-26 ENCOUNTER — HOSPITAL ENCOUNTER (OUTPATIENT)
Dept: PULMONOLOGY | Age: 53
Discharge: HOME | End: 2018-02-26
Attending: NURSE PRACTITIONER | Admitting: NURSE PRACTITIONER

## 2018-02-26 DIAGNOSIS — R06.09 OTHER FORMS OF DYSPNEA: ICD-10-CM

## 2018-02-26 LAB
ANION GAP SERPL CALCULATED.3IONS-SCNC: 13 MMOL/L (ref 3–16)
BUN BLDV-MCNC: 22 MG/DL (ref 7–20)
CALCIUM SERPL-MCNC: 9.7 MG/DL (ref 8.3–10.6)
CHLORIDE BLD-SCNC: 98 MMOL/L (ref 99–110)
CO2: 31 MMOL/L (ref 21–32)
CREAT SERPL-MCNC: 1 MG/DL (ref 0.9–1.3)
GFR AFRICAN AMERICAN: >60
GFR NON-AFRICAN AMERICAN: >60
GLUCOSE BLD-MCNC: 116 MG/DL (ref 70–99)
POTASSIUM SERPL-SCNC: 4.4 MMOL/L (ref 3.5–5.1)
PRO-BNP: 1185 PG/ML (ref 0–124)
SODIUM BLD-SCNC: 142 MMOL/L (ref 136–145)

## 2018-02-26 RX ORDER — ALBUTEROL SULFATE 2.5 MG/3ML
2.5 SOLUTION RESPIRATORY (INHALATION) ONCE
Status: COMPLETED | OUTPATIENT
Start: 2018-02-26 | End: 2018-02-26

## 2018-02-26 RX ADMIN — ALBUTEROL SULFATE 2.5 MG: 2.5 SOLUTION RESPIRATORY (INHALATION) at 09:04

## 2018-03-01 ENCOUNTER — OFFICE VISIT (OUTPATIENT)
Dept: CARDIOLOGY CLINIC | Age: 53
End: 2018-03-01

## 2018-03-01 VITALS
SYSTOLIC BLOOD PRESSURE: 140 MMHG | HEART RATE: 94 BPM | HEIGHT: 73 IN | DIASTOLIC BLOOD PRESSURE: 72 MMHG | WEIGHT: 269.2 LBS | BODY MASS INDEX: 35.68 KG/M2 | OXYGEN SATURATION: 98 %

## 2018-03-01 DIAGNOSIS — I10 ESSENTIAL HYPERTENSION: ICD-10-CM

## 2018-03-01 DIAGNOSIS — I50.22 CHRONIC SYSTOLIC CONGESTIVE HEART FAILURE (HCC): Primary | ICD-10-CM

## 2018-03-01 DIAGNOSIS — I42.0 CARDIOMYOPATHY, DILATED, NONISCHEMIC (HCC): ICD-10-CM

## 2018-03-01 PROBLEM — I50.21 ACUTE SYSTOLIC CONGESTIVE HEART FAILURE (HCC): Chronic | Status: ACTIVE | Noted: 2017-09-20

## 2018-03-01 PROCEDURE — G8427 DOCREV CUR MEDS BY ELIG CLIN: HCPCS | Performed by: INTERNAL MEDICINE

## 2018-03-01 PROCEDURE — 99244 OFF/OP CNSLTJ NEW/EST MOD 40: CPT | Performed by: INTERNAL MEDICINE

## 2018-03-01 PROCEDURE — G8417 CALC BMI ABV UP PARAM F/U: HCPCS | Performed by: INTERNAL MEDICINE

## 2018-03-01 PROCEDURE — 3017F COLORECTAL CA SCREEN DOC REV: CPT | Performed by: INTERNAL MEDICINE

## 2018-03-01 PROCEDURE — G8484 FLU IMMUNIZE NO ADMIN: HCPCS | Performed by: INTERNAL MEDICINE

## 2018-03-01 RX ORDER — DIGOXIN 125 MCG
125 TABLET ORAL DAILY
Qty: 30 TABLET | Refills: 3 | Status: SHIPPED | OUTPATIENT
Start: 2018-03-01 | End: 2018-04-13 | Stop reason: SINTOL

## 2018-03-01 RX ORDER — FUROSEMIDE 40 MG/1
TABLET ORAL
Qty: 90 TABLET | Refills: 5 | Status: SHIPPED | OUTPATIENT
Start: 2018-03-01 | End: 2018-05-18 | Stop reason: SINTOL

## 2018-03-01 RX ORDER — FUROSEMIDE 40 MG/1
TABLET ORAL
Qty: 45 TABLET | Refills: 5 | Status: SHIPPED | OUTPATIENT
Start: 2018-03-01 | End: 2018-03-01 | Stop reason: SDUPTHER

## 2018-03-01 NOTE — PROGRESS NOTES
Pressure Monitoring (SPHYGMOMANOMETER) MISC Check blood pressure once weekly. 1 each 0    aspirin 81 MG chewable tablet Take 1 tablet by mouth daily 30 tablet 3    fluocinonide (LIDEX) 0.05 % cream APPLY TO AFFECTED AREA(S) TWO TIMES A DAY 30 g 0    terbinafine (LAMISIL) 1 % cream Apply to affected areas 2x/day for 2-4 weeks, until rash resolved. 24 g 1    triamcinolone (KENALOG) 0.1 % cream Apply topically 2 times daily. 45 g 1     No current facility-administered medications for this visit. Past Medical History:   Diagnosis Date    Esophagitis     In [place], EGD    Hypertension      No past surgical history on file. Family History   Problem Relation Age of Onset    Diabetes Maternal Grandmother     Alcohol Abuse Maternal Aunt     Alcohol Abuse Maternal Uncle     Alcohol Abuse Maternal Grandfather     Hypertension Father     Heart Disease Father 48    Bipolar Disorder Maternal Aunt     Obesity Brother      Social History     Social History    Marital status:      Spouse name: N/A    Number of children: N/A    Years of education: N/A     Occupational History    Not on file. Social History Main Topics    Smoking status: Never Smoker    Smokeless tobacco: Current User     Types: Snuff    Alcohol use Yes      Comment: occasional alcohol use; 7 to 14    Drug use: No    Sexual activity: Not on file     Other Topics Concern    Not on file     Social History Narrative    No narrative on file       Review of Systems:   · Constitutional: there has been no unanticipated weight loss. There's been no change in energy level, sleep pattern, or activity level. · Eyes: No visual changes or diplopia. No scleral icterus. · ENT: No Headaches, hearing loss or vertigo. No mouth sores or sore throat. · Cardiovascular: Reviewed in HPI  · Respiratory: No cough or wheezing, no sputum production. No hematemesis. · Gastrointestinal: No abdominal pain, appetite loss, blood in stools.  No change in bowel or bladder habits. · Genitourinary: No dysuria, trouble voiding, or hematuria. · Musculoskeletal:  No gait disturbance, weakness or joint complaints. · Integumentary: No rash or pruritis. · Neurological: No headache, diplopia, change in muscle strength, numbness or tingling. No change in gait, balance, coordination, mood, affect, memory, mentation, behavior. · Psychiatric: No anxiety, no depression. · Endocrine: No malaise, fatigue or temperature intolerance. No excessive thirst, fluid intake, or urination. No tremor. · Hematologic/Lymphatic: No abnormal bruising or bleeding, blood clots or swollen lymph nodes. · Allergic/Immunologic: No nasal congestion or hives. Physical Examination:    BP (!) 140/72 (Site: Right Arm, Position: Sitting, Cuff Size: Large Adult)   Pulse 94   Ht 6' 1\" (1.854 m)   Wt 269 lb 3.2 oz (122.1 kg)   SpO2 98%   BMI 35.52 kg/m² stable. Wt Readings from Last 3 Encounters:   02/16/18 262 lb 6.4 oz (119 kg)   01/31/18 266 lb 6.4 oz (120.8 kg)   01/04/18 260 lb (117.9 kg)     BP Readings from Last 3 Encounters:   02/16/18 128/72   01/31/18 (!) 146/72   01/04/18 136/84     Constitutional and General Appearance:   WD/WN in NAD  HEENT:  NC/AT  SAURAV  No problems with hearing  Skin:  Warm, dry  Respiratory:  · Normal excursion and expansion without use of accessory muscles  · Resp Auscultation: Normal breath sounds without dullness  Cardiovascular:  · The apical impulses not displaced  · Heart tones are crisp and normal  · Cervical veins are not engorged  · The carotid upstroke is normal in amplitude and contour without delay or bruit  · JVP less than 8 cm H2O  RRR with nl S1 and S2 without m,r,g  · Peripheral pulses are symmetrical and full  · There is no clubbing, cyanosis of the extremities.   · No edema  · Femoral Arteries: 2+ and equal  · Pedal Pulses: 2+ and equal   Neck:  · No thyromegaly  Abdomen:  Increased abdominal girth consistent with ascites  · No

## 2018-03-01 NOTE — PATIENT INSTRUCTIONS
Plan:  Increase entresto to 49-51 mg twice daily. Will give samples and have KV check into [place]. Start Digoxin 0.125 mg daily. Increase lasix 40 mg to 2 tablets in the mornings and 1 tablet I the afternoon until he loose 5-6 pounds then go back to 1 tablet twice daily. RTO in 2 weeks.

## 2018-03-13 DIAGNOSIS — I50.22 CHRONIC SYSTOLIC CONGESTIVE HEART FAILURE (HCC): ICD-10-CM

## 2018-03-13 DIAGNOSIS — I42.0 CARDIOMYOPATHY, DILATED, NONISCHEMIC (HCC): ICD-10-CM

## 2018-03-13 DIAGNOSIS — I10 ESSENTIAL HYPERTENSION: ICD-10-CM

## 2018-03-13 LAB
ANION GAP SERPL CALCULATED.3IONS-SCNC: 16 MMOL/L (ref 3–16)
BUN BLDV-MCNC: 32 MG/DL (ref 7–20)
CALCIUM SERPL-MCNC: 9.5 MG/DL (ref 8.3–10.6)
CHLORIDE BLD-SCNC: 98 MMOL/L (ref 99–110)
CO2: 28 MMOL/L (ref 21–32)
CREAT SERPL-MCNC: 1.1 MG/DL (ref 0.9–1.3)
GFR AFRICAN AMERICAN: >60
GFR NON-AFRICAN AMERICAN: >60
GLUCOSE BLD-MCNC: 108 MG/DL (ref 70–99)
POTASSIUM SERPL-SCNC: 4.6 MMOL/L (ref 3.5–5.1)
PRO-BNP: 234 PG/ML (ref 0–124)
SODIUM BLD-SCNC: 142 MMOL/L (ref 136–145)

## 2018-03-13 NOTE — PROCEDURES
Pulmonary Function Test:     Indication: Congestive heart failure, bronchitis. Never smoker    Test comment:     Spirometry data is acceptable and reproducible. Maximum effort given during the test.    Pulse ox is 98% on room air    Estimated body mass index is 35.52 kg/m² as calculated from the following:    Height as of 3/1/18: 6' 1\" (1.854 m). Weight as of 3/1/18: 269 lb 3.2 oz (122.1 kg). Spirometry data:    FEV1/FVC: 72. Predicted ratio 78    Pre-Bronchodilator FEV1 3.95 L, which is 92 % predicted    Post-Bronchodilator FEV1 4.12 L, which is 96% predicted    There is +4 % reversibility     FVC is 5.45 L, which is 98 % predicted    Lung Volumes:    TLC is 7.92 L, which is 104 % predicted    RV is 2.7 L which is 121 % predicted    Diffusion Capacity:    DLCO is 35.25 which is 89 % predicted    Impression:    1. There is mild obstruction present    2. There is no significant response to bronchodilator therapy         [Increase in FEV1 => 12% of control and => 200 ml]    3. There is no reduction in diffusion capacity    Comment: Mild obstructive disease with no significant response to bronchodilator. Normal diffusion capacity. PFT finding is suggestive of mild COPD.   Clinical correlation recommended

## 2018-03-15 ENCOUNTER — OFFICE VISIT (OUTPATIENT)
Dept: CARDIOLOGY CLINIC | Age: 53
End: 2018-03-15

## 2018-03-15 ENCOUNTER — TELEPHONE (OUTPATIENT)
Dept: CARDIOLOGY CLINIC | Age: 53
End: 2018-03-15

## 2018-03-15 VITALS
WEIGHT: 270.2 LBS | BODY MASS INDEX: 35.65 KG/M2 | SYSTOLIC BLOOD PRESSURE: 138 MMHG | DIASTOLIC BLOOD PRESSURE: 74 MMHG | HEART RATE: 75 BPM

## 2018-03-15 DIAGNOSIS — I50.22 CHRONIC SYSTOLIC CONGESTIVE HEART FAILURE (HCC): Primary | ICD-10-CM

## 2018-03-15 DIAGNOSIS — I42.0 CARDIOMYOPATHY, DILATED, NONISCHEMIC (HCC): Chronic | ICD-10-CM

## 2018-03-15 DIAGNOSIS — I10 ESSENTIAL HYPERTENSION: Chronic | ICD-10-CM

## 2018-03-15 PROBLEM — I50.21 ACUTE SYSTOLIC CONGESTIVE HEART FAILURE (HCC): Chronic | Status: RESOLVED | Noted: 2017-09-20 | Resolved: 2018-03-15

## 2018-03-15 PROCEDURE — G8417 CALC BMI ABV UP PARAM F/U: HCPCS | Performed by: NURSE PRACTITIONER

## 2018-03-15 PROCEDURE — G8482 FLU IMMUNIZE ORDER/ADMIN: HCPCS | Performed by: NURSE PRACTITIONER

## 2018-03-15 PROCEDURE — 4004F PT TOBACCO SCREEN RCVD TLK: CPT | Performed by: NURSE PRACTITIONER

## 2018-03-15 PROCEDURE — 99213 OFFICE O/P EST LOW 20 MIN: CPT | Performed by: NURSE PRACTITIONER

## 2018-03-15 PROCEDURE — 3017F COLORECTAL CA SCREEN DOC REV: CPT | Performed by: NURSE PRACTITIONER

## 2018-03-15 PROCEDURE — G8427 DOCREV CUR MEDS BY ELIG CLIN: HCPCS | Performed by: NURSE PRACTITIONER

## 2018-03-15 ASSESSMENT — ENCOUNTER SYMPTOMS
SHORTNESS OF BREATH: 1
GASTROINTESTINAL NEGATIVE: 1

## 2018-03-15 NOTE — PROGRESS NOTES
sacubitril-valsartan (ENTRESTO)  MG per tablet Take 1 tablet by mouth 2 times daily 3/1/18  Yes Himanshu Zaragoza MD   furosemide (LASIX) 40 MG tablet Take 2 tabs in the am and 1 I the pm 3/1/18  Yes Himanshu Zaragoza MD   carvedilol (COREG) 12.5 MG tablet Take 2 tablets by mouth 2 times daily 2/16/18 3/18/18 Yes Roxanne Morrison NP   spironolactone (ALDACTONE) 25 MG tablet Take 1 tablet by mouth daily 1/12/18  Yes Roxanne Morrison NP   Blood Pressure Monitoring QUARTERMAIN) MISC Check blood pressure once weekly. 10/20/17  Yes Romina Palacios DO   aspirin 81 MG chewable tablet Take 1 tablet by mouth daily 9/22/17  Yes Julio Erwin MD   fluocinonide (LIDEX) 0.05 % cream APPLY TO AFFECTED AREA(S) TWO TIMES A DAY 5/5/17   Romina Palacios DO   terbinafine (LAMISIL) 1 % cream Apply to affected areas 2x/day for 2-4 weeks, until rash resolved. 11/23/16   Romina Palacios DO   triamcinolone (KENALOG) 0.1 % cream Apply topically 2 times daily. 5/21/13   Melissa Chung MD        Allergies:  Patient has no known allergies. ROS:   Review of Systems   Constitutional: Positive for fatigue. HENT: Positive for congestion. Respiratory: Positive for shortness of breath. With steps   Cardiovascular: Negative. Gastrointestinal: Negative. Endocrine: Negative. Genitourinary: Negative. Musculoskeletal: Negative. Skin: Negative. Neurological: Negative. Negative for numbness. Hematological: Negative. Psychiatric/Behavioral: Negative. Physical Examination:    Vitals:    03/15/18 1533   BP: 138/74   Pulse: 75   Weight: 270 lb 3.2 oz (122.6 kg)           Physical Exam   Constitutional: He is oriented to person, place, and time. He appears well-developed and well-nourished. HENT:   Head: Normocephalic and atraumatic. Eyes: Pupils are equal, round, and reactive to light. Neck: Normal range of motion. Neck supple.    Cardiovascular: Normal rate, regular rhythm, normal heart sounds and intact distal pulses. Pulmonary/Chest: Effort normal. He has wheezes. Bilateral bases   Abdominal: Soft. Musculoskeletal: Normal range of motion. Neurological: He is alert and oriented to person, place, and time. Skin: Skin is warm and dry. Psychiatric: He has a normal mood and affect. Lab Data:    CBC:   Lab Results   Component Value Date    WBC 6.5 09/22/2017    WBC 6.1 09/21/2017    WBC 7.7 09/20/2017    RBC 4.89 09/22/2017    RBC 4.79 09/21/2017    RBC 4.82 09/20/2017    HGB 14.7 09/22/2017    HGB 14.6 09/21/2017    HGB 14.5 09/20/2017    HCT 44.3 09/22/2017    HCT 43.3 09/21/2017    HCT 43.4 09/20/2017    MCV 90.6 09/22/2017    MCV 90.4 09/21/2017    MCV 90.1 09/20/2017    RDW 14.1 09/22/2017    RDW 13.8 09/21/2017    RDW 13.7 09/20/2017     09/22/2017     09/21/2017     09/20/2017     BMP:  Lab Results   Component Value Date     03/13/2018     02/26/2018     02/05/2018    K 4.6 03/13/2018    K 4.4 02/26/2018    K 4.1 02/05/2018    CL 98 03/13/2018    CL 98 02/26/2018    CL 99 02/05/2018    CO2 28 03/13/2018    CO2 31 02/26/2018    CO2 29 02/05/2018    PHOS 3.3 09/22/2017    PHOS 3.6 09/21/2017    PHOS 4.4 09/20/2017    BUN 32 03/13/2018    BUN 22 02/26/2018    BUN 22 02/05/2018    CREATININE 1.1 03/13/2018    CREATININE 1.0 02/26/2018    CREATININE 0.9 02/05/2018     BNP:   Lab Results   Component Value Date    PROBNP 234 03/13/2018    PROBNP 1,185 02/26/2018    PROBNP 652 02/05/2018        Cardiac Imaging: Echo 9/19/17  Summary   Left ventricular size is moderately dilated with mild concentric left   ventricular hypertrophy. The left ventricular systolic function is severely reduced with an ejection   fraction of 20 %. Global hypokinesis of the left ventricle. Moderate mitral regurgitation   Trivial aortic regurgitation   Moderate tricuspid regurgitation with RVSP estimated at 67 mmHg.    Biatrial enlargement. [facility]: 9/21/17:   CONCLUSIONS:  1. Normal epicardial coronary arteries. 2.  Left coronary dominance. 3.  LVEDP 25 mmHg post angiography and post extensive diuresis over  the last day and half. 4.  Successful Angio-Seal right femoral arteriotomy. PFT's 2/26/18  Impression:    1. There is mild obstruction present    2. There is no significant response to bronchodilator therapy         [Increase in FEV1 => 12% of control and => 200 ml]    3. There is no reduction in diffusion capacity    Device: No  ICD/[employer] Counseling: No    LVEF<35% and Class II-III HF Yes   GDMT>3months No  BUN>50 No  QRS>110 Yes       AMARIS Evaulation:  No, most likely needs referral - resistant      Assessment:    Encounter Diagnoses   Name     Chronic systolic congestive heart failure (HCC) Fluid balance positive, may change to torsemide    Cardiomyopathy, dilated, nonischemic (HCC) Increase entresto    Essential hypertension controlled         Plan:   1. Medications: increase entresto to 49/51 twice a day  2. Labs: one week, call [facility] in a week if no improvement in fluid and we'll change lasix to torsemide  3. Follow-up: 4weeks      CHF Resource Line: [phone]      I appreciate the opportunity of cooperating in the care of this individual.    Negin Santiago CNP, 3/15/2018, 3:54 PM    QUALITY MEASURES  1. Tobacco Cessation Counseling:NA  2. Retake of BP if >140/90:  yes  3. Documentation to PCP/referring for new patient:  Sent to PCP at close of office visit  4. CAD patient on anti-platelet:na  5. CAD patient on STATIN therapy:  na  6. Patient with CHF and aFib on anticoagulation: na  7. Patient Education:  Yes   8. BB for LVSD :  Yes   9. ACE/ARB for LVSD:  Yes   10.  Left Ventricular Ejection Fraction (LVEF) Assessment:  Yes

## 2018-03-15 NOTE — PATIENT INSTRUCTIONS
1. Medications: increase entresto to 49/51 twice a day  2. Labs: one week, call [facility] Yes[facility] in a week if no improvement in fluid and we'll change lasix  3.  Follow-up: 4weeks

## 2018-03-15 NOTE — PROGRESS NOTES
[address]  Advanced CHF/Pulmonary Hypertension   Cardiac Follow up      Eliceo Campos  YOB: 1965    Date of Visit:  3/15/18      No chief complaint on file. History of Present Illness:  Eliceo Campos is a 48 y.o. male with no prior med history until NICM, HFrEF dx 9/17 who presents for CHF management and treatment, referred by Alissa Guajardo NP. He did not have close f/u for med titration from Oct until the end of January and just does not feel much better. At his OV 2 weeks ago,  His lasix was increased for wt gain of about 10lb in the past 3months and doubled his lisinopril. He called a couple of months later with c/o stomach upset and dizziness with very little diuresis so I resumed lower doses of lasix and lisinopril and increased his coreg. He refuses a sleep study. Today, he states that he doesn't feel any different except he no longer wake up at night feeling smothery since starting entresto 24-26 twice daily. He was started on entresto ~ 1 week ago , was previously on lisinopril. He states that he feels as if he is holding onto water, distended, bloated abdomen. He does ot get ankle swelling. He has gained some body weight over the past 4 months. He is still fatigue. He has not been on digoxin.  He states that his insurance is not covering much of the cost for entresto and it will cost him $500 out of pocket.        No Known Allergies  Current Outpatient Prescriptions   Medication Sig Dispense Refill    digoxin (LANOXIN) 125 MCG tablet Take 1 tablet by mouth daily 30 tablet 3    sacubitril-valsartan (ENTRESTO)  MG per tablet Take 1 tablet by mouth 2 times daily 60 tablet 11    furosemide (LASIX) 40 MG tablet Take 2 tabs in the am and 1 I the pm 90 tablet 5    carvedilol (COREG) 12.5 MG tablet Take 2 tablets by mouth 2 times daily 60 tablet 3    spironolactone (ALDACTONE) 25 MG tablet Take 1 tablet by mouth daily 30 tablet 3    Blood Pressure Monitoring office visit  4. CAD patient on anti-platelet: NA  5. CAD patient on STATIN therapy:  NA  6. Patient with CHF and aFib on anticoagulation:  NA    I appreciate the opportunity of cooperating in the care of this patient.     Jj Hernandez M.D., [facility]

## 2018-03-20 ENCOUNTER — TELEPHONE (OUTPATIENT)
Dept: CARDIOLOGY CLINIC | Age: 53
End: 2018-03-20

## 2018-04-09 DIAGNOSIS — I50.22 CHRONIC SYSTOLIC CONGESTIVE HEART FAILURE (HCC): ICD-10-CM

## 2018-04-10 LAB
ANION GAP SERPL CALCULATED.3IONS-SCNC: 16 MMOL/L (ref 3–16)
BUN BLDV-MCNC: 23 MG/DL (ref 7–20)
CALCIUM SERPL-MCNC: 9.6 MG/DL (ref 8.3–10.6)
CHLORIDE BLD-SCNC: 95 MMOL/L (ref 99–110)
CO2: 31 MMOL/L (ref 21–32)
CREAT SERPL-MCNC: 1.1 MG/DL (ref 0.9–1.3)
GFR AFRICAN AMERICAN: >60
GFR NON-AFRICAN AMERICAN: >60
GLUCOSE BLD-MCNC: 103 MG/DL (ref 70–99)
POTASSIUM SERPL-SCNC: 4 MMOL/L (ref 3.5–5.1)
PRO-BNP: 242 PG/ML (ref 0–124)
SODIUM BLD-SCNC: 142 MMOL/L (ref 136–145)

## 2018-04-12 ASSESSMENT — ENCOUNTER SYMPTOMS
SHORTNESS OF BREATH: 1
GASTROINTESTINAL NEGATIVE: 1

## 2018-04-13 ENCOUNTER — OFFICE VISIT (OUTPATIENT)
Dept: CARDIOLOGY CLINIC | Age: 53
End: 2018-04-13

## 2018-04-13 VITALS
WEIGHT: 264.6 LBS | HEART RATE: 65 BPM | BODY MASS INDEX: 34.91 KG/M2 | DIASTOLIC BLOOD PRESSURE: 68 MMHG | SYSTOLIC BLOOD PRESSURE: 132 MMHG

## 2018-04-13 DIAGNOSIS — I10 ESSENTIAL HYPERTENSION: Chronic | ICD-10-CM

## 2018-04-13 DIAGNOSIS — I50.22 CHRONIC SYSTOLIC CONGESTIVE HEART FAILURE (HCC): Primary | ICD-10-CM

## 2018-04-13 DIAGNOSIS — I42.0 CARDIOMYOPATHY, DILATED, NONISCHEMIC (HCC): Chronic | ICD-10-CM

## 2018-04-13 PROCEDURE — 99213 OFFICE O/P EST LOW 20 MIN: CPT | Performed by: NURSE PRACTITIONER

## 2018-04-13 PROCEDURE — G8417 CALC BMI ABV UP PARAM F/U: HCPCS | Performed by: NURSE PRACTITIONER

## 2018-04-13 PROCEDURE — G8427 DOCREV CUR MEDS BY ELIG CLIN: HCPCS | Performed by: NURSE PRACTITIONER

## 2018-04-13 PROCEDURE — 3017F COLORECTAL CA SCREEN DOC REV: CPT | Performed by: NURSE PRACTITIONER

## 2018-04-13 PROCEDURE — 4004F PT TOBACCO SCREEN RCVD TLK: CPT | Performed by: NURSE PRACTITIONER

## 2018-05-02 ENCOUNTER — TELEPHONE (OUTPATIENT)
Dept: CARDIOLOGY CLINIC | Age: 53
End: 2018-05-02

## 2018-05-02 ENCOUNTER — HOSPITAL ENCOUNTER (OUTPATIENT)
Dept: NON INVASIVE DIAGNOSTICS | Age: 53
Discharge: HOME | End: 2018-05-02
Attending: NURSE PRACTITIONER | Admitting: NURSE PRACTITIONER

## 2018-05-02 DIAGNOSIS — I50.22 CHRONIC SYSTOLIC CONGESTIVE HEART FAILURE (HCC): ICD-10-CM

## 2018-05-02 LAB
LV EF: 28 %
LVEF MODALITY: NORMAL

## 2018-05-14 ENCOUNTER — TELEPHONE (OUTPATIENT)
Dept: CARDIOLOGY | Age: 53
End: 2018-05-14

## 2018-05-15 DIAGNOSIS — I50.22 CHRONIC SYSTOLIC CONGESTIVE HEART FAILURE (HCC): ICD-10-CM

## 2018-05-15 LAB
ANION GAP SERPL CALCULATED.3IONS-SCNC: 17 MMOL/L (ref 3–16)
BUN BLDV-MCNC: 53 MG/DL (ref 7–20)
CALCIUM SERPL-MCNC: 9.3 MG/DL (ref 8.3–10.6)
CHLORIDE BLD-SCNC: 95 MMOL/L (ref 99–110)
CO2: 28 MMOL/L (ref 21–32)
CREAT SERPL-MCNC: 2.7 MG/DL (ref 0.9–1.3)
GFR AFRICAN AMERICAN: 30
GFR NON-AFRICAN AMERICAN: 25
GLUCOSE BLD-MCNC: 100 MG/DL (ref 70–99)
POTASSIUM SERPL-SCNC: 4.6 MMOL/L (ref 3.5–5.1)
PRO-BNP: 179 PG/ML (ref 0–124)
SODIUM BLD-SCNC: 140 MMOL/L (ref 136–145)

## 2018-05-16 ENCOUNTER — TELEPHONE (OUTPATIENT)
Dept: CARDIOLOGY | Age: 53
End: 2018-05-16

## 2018-05-18 ENCOUNTER — OFFICE VISIT (OUTPATIENT)
Dept: CARDIOLOGY CLINIC | Age: 53
End: 2018-05-18

## 2018-05-18 VITALS
WEIGHT: 261.8 LBS | SYSTOLIC BLOOD PRESSURE: 118 MMHG | HEART RATE: 66 BPM | DIASTOLIC BLOOD PRESSURE: 64 MMHG | BODY MASS INDEX: 34.54 KG/M2

## 2018-05-18 DIAGNOSIS — I42.0 CARDIOMYOPATHY, DILATED, NONISCHEMIC (HCC): Chronic | ICD-10-CM

## 2018-05-18 DIAGNOSIS — I50.22 CHRONIC SYSTOLIC CONGESTIVE HEART FAILURE (HCC): Primary | ICD-10-CM

## 2018-05-18 DIAGNOSIS — I10 ESSENTIAL HYPERTENSION: Chronic | ICD-10-CM

## 2018-05-18 PROBLEM — F10.90 HEAVY ALCOHOL CONSUMPTION: Status: RESOLVED | Noted: 2017-09-14 | Resolved: 2018-05-18

## 2018-05-18 PROCEDURE — 3017F COLORECTAL CA SCREEN DOC REV: CPT | Performed by: NURSE PRACTITIONER

## 2018-05-18 PROCEDURE — G8417 CALC BMI ABV UP PARAM F/U: HCPCS | Performed by: NURSE PRACTITIONER

## 2018-05-18 PROCEDURE — G8427 DOCREV CUR MEDS BY ELIG CLIN: HCPCS | Performed by: NURSE PRACTITIONER

## 2018-05-18 PROCEDURE — 99213 OFFICE O/P EST LOW 20 MIN: CPT | Performed by: NURSE PRACTITIONER

## 2018-05-18 PROCEDURE — 4004F PT TOBACCO SCREEN RCVD TLK: CPT | Performed by: NURSE PRACTITIONER

## 2018-05-18 ASSESSMENT — ENCOUNTER SYMPTOMS
DIARRHEA: 1
SHORTNESS OF BREATH: 1
ABDOMINAL PAIN: 1

## 2018-05-21 LAB
ANION GAP SERPL CALCULATED.3IONS-SCNC: 15 MMOL/L (ref 3–16)
BUN BLDV-MCNC: 17 MG/DL (ref 7–20)
CALCIUM SERPL-MCNC: 8.9 MG/DL (ref 8.3–10.6)
CHLORIDE BLD-SCNC: 105 MMOL/L (ref 99–110)
CO2: 26 MMOL/L (ref 21–32)
CREAT SERPL-MCNC: 1 MG/DL (ref 0.9–1.3)
GFR AFRICAN AMERICAN: >60
GFR NON-AFRICAN AMERICAN: >60
GLUCOSE BLD-MCNC: 98 MG/DL (ref 70–99)
POTASSIUM SERPL-SCNC: 4.1 MMOL/L (ref 3.5–5.1)
PRO-BNP: 2531 PG/ML (ref 0–124)
SODIUM BLD-SCNC: 146 MMOL/L (ref 136–145)

## 2018-05-22 ENCOUNTER — TELEPHONE (OUTPATIENT)
Dept: CARDIOLOGY CLINIC | Age: 53
End: 2018-05-22

## 2018-05-22 NOTE — TELEPHONE ENCOUNTER
Spoke with pt regarding lab results with  instructions to restart medication pt voiced understanding

## 2018-05-23 ENCOUNTER — TELEPHONE (OUTPATIENT)
Dept: CARDIOLOGY CLINIC | Age: 53
End: 2018-05-23

## 2018-05-23 DIAGNOSIS — I50.22 CHRONIC SYSTOLIC CONGESTIVE HEART FAILURE (HCC): Primary | ICD-10-CM

## 2018-05-30 LAB
ANION GAP SERPL CALCULATED.3IONS-SCNC: 12 MMOL/L (ref 3–16)
BUN BLDV-MCNC: 18 MG/DL (ref 7–20)
CALCIUM SERPL-MCNC: 9 MG/DL (ref 8.3–10.6)
CHLORIDE BLD-SCNC: 102 MMOL/L (ref 99–110)
CO2: 26 MMOL/L (ref 21–32)
CREAT SERPL-MCNC: 0.9 MG/DL (ref 0.9–1.3)
GFR AFRICAN AMERICAN: >60
GFR NON-AFRICAN AMERICAN: >60
GLUCOSE BLD-MCNC: 100 MG/DL (ref 70–99)
POTASSIUM SERPL-SCNC: 4.4 MMOL/L (ref 3.5–5.1)
PRO-BNP: 991 PG/ML (ref 0–124)
SODIUM BLD-SCNC: 140 MMOL/L (ref 136–145)

## 2018-06-04 ENCOUNTER — TELEPHONE (OUTPATIENT)
Dept: CARDIOLOGY CLINIC | Age: 53
End: 2018-06-04

## 2018-06-08 ENCOUNTER — TELEPHONE (OUTPATIENT)
Dept: CARDIOLOGY CLINIC | Age: 53
End: 2018-06-08

## 2018-06-08 DIAGNOSIS — I50.22 CHRONIC SYSTOLIC CONGESTIVE HEART FAILURE (HCC): Primary | ICD-10-CM

## 2018-06-08 RX ORDER — SPIRONOLACTONE 25 MG/1
25 TABLET ORAL DAILY
Qty: 30 TABLET | Refills: 3
Start: 2018-06-08

## 2018-06-08 RX ORDER — FUROSEMIDE 40 MG/1
40 TABLET ORAL DAILY
Qty: 60 TABLET | Refills: 3
Start: 2018-06-08 | End: 2018-07-28 | Stop reason: SDUPTHER

## 2018-06-11 DIAGNOSIS — I50.22 CHRONIC SYSTOLIC CONGESTIVE HEART FAILURE (HCC): ICD-10-CM

## 2018-06-11 RX ORDER — SPIRONOLACTONE 25 MG/1
TABLET ORAL
Qty: 90 TABLET | Refills: 3 | OUTPATIENT
Start: 2018-06-11

## 2018-06-14 ENCOUNTER — TELEPHONE (OUTPATIENT)
Dept: CARDIOLOGY CLINIC | Age: 53
End: 2018-06-14

## 2018-06-19 DIAGNOSIS — I50.22 CHRONIC SYSTOLIC CONGESTIVE HEART FAILURE (HCC): ICD-10-CM

## 2018-06-19 LAB
ANION GAP SERPL CALCULATED.3IONS-SCNC: 13 MMOL/L (ref 3–16)
BUN BLDV-MCNC: 29 MG/DL (ref 7–20)
CALCIUM SERPL-MCNC: 9.4 MG/DL (ref 8.3–10.6)
CHLORIDE BLD-SCNC: 99 MMOL/L (ref 99–110)
CO2: 31 MMOL/L (ref 21–32)
CREAT SERPL-MCNC: 1.2 MG/DL (ref 0.9–1.3)
GFR AFRICAN AMERICAN: >60
GFR NON-AFRICAN AMERICAN: >60
GLUCOSE BLD-MCNC: 103 MG/DL (ref 70–99)
POTASSIUM SERPL-SCNC: 4.1 MMOL/L (ref 3.5–5.1)
PRO-BNP: 122 PG/ML (ref 0–124)
SODIUM BLD-SCNC: 143 MMOL/L (ref 136–145)

## 2018-07-12 DIAGNOSIS — I50.22 CHRONIC SYSTOLIC CONGESTIVE HEART FAILURE (HCC): ICD-10-CM

## 2018-07-12 RX ORDER — CARVEDILOL 12.5 MG/1
TABLET ORAL
Qty: 120 TABLET | Refills: 2 | Status: SHIPPED | OUTPATIENT
Start: 2018-07-12

## 2018-07-12 NOTE — TELEPHONE ENCOUNTER
Medication: Carvedilol    Strength: 12.5 mg     Directions:  Take two tablets by mouth twice a day     Last visit : 4/13/18    Next Visit : 7/20/18    Last fill: 3/18/18

## 2018-07-28 RX ORDER — FUROSEMIDE 40 MG/1
40 TABLET ORAL DAILY
Qty: 60 TABLET | Refills: 3 | Status: SHIPPED | OUTPATIENT
Start: 2018-07-28

## 2019-01-01 ENCOUNTER — HOSPITAL ENCOUNTER (EMERGENCY)
Age: 54
End: 2019-11-23
Attending: EMERGENCY MEDICINE
Payer: COMMERCIAL

## 2019-01-01 ENCOUNTER — TELEPHONE (OUTPATIENT)
Dept: CARDIOLOGY CLINIC | Age: 54
End: 2019-01-01

## 2019-01-01 ENCOUNTER — TELEPHONE (OUTPATIENT)
Dept: FAMILY MEDICINE CLINIC | Age: 54
End: 2019-01-01

## 2019-01-01 VITALS — OXYGEN SATURATION: 80 %

## 2019-01-01 DIAGNOSIS — I46.9 CARDIAC ARREST (HCC): Primary | ICD-10-CM

## 2019-01-01 LAB
BASE EXCESS VENOUS: -6 (ref -3–3)
HCO3 VENOUS: 23.3 MMOL/L (ref 23–29)
LACTATE: 9.16 MMOL/L (ref 0.4–2)
O2 SAT, VEN: 47 %
PCO2, VEN: 70.5 MM HG (ref 40–50)
PERFORMED ON: ABNORMAL
PH VENOUS: 7.13 (ref 7.35–7.45)
PO2, VEN: 34 MM HG
POC SAMPLE TYPE: ABNORMAL
TCO2 CALC VENOUS: 25 MMOL/L

## 2019-01-01 PROCEDURE — 83605 ASSAY OF LACTIC ACID: CPT

## 2019-01-01 PROCEDURE — 94761 N-INVAS EAR/PLS OXIMETRY MLT: CPT

## 2019-01-01 PROCEDURE — 2700000000 HC OXYGEN THERAPY PER DAY

## 2019-01-01 PROCEDURE — 6360000002 HC RX W HCPCS

## 2019-01-01 PROCEDURE — 94770 HC ETCO2 MONITOR DAILY: CPT

## 2019-01-01 PROCEDURE — 82803 BLOOD GASES ANY COMBINATION: CPT

## 2019-01-01 PROCEDURE — 2500000003 HC RX 250 WO HCPCS

## 2019-01-01 PROCEDURE — 4500000026 HC ED CRITICAL CARE PROCEDURE

## 2019-01-01 PROCEDURE — 92950 HEART/LUNG RESUSCITATION CPR: CPT

## 2019-01-01 PROCEDURE — 99291 CRITICAL CARE FIRST HOUR: CPT

## 2019-01-01 RX ORDER — SODIUM CHLORIDE 9 MG/ML
INJECTION, SOLUTION INTRAVENOUS
Status: DISCONTINUED
Start: 2019-01-01 | End: 2019-01-01 | Stop reason: HOSPADM